# Patient Record
Sex: FEMALE | URBAN - METROPOLITAN AREA
[De-identification: names, ages, dates, MRNs, and addresses within clinical notes are randomized per-mention and may not be internally consistent; named-entity substitution may affect disease eponyms.]

---

## 2022-07-03 ENCOUNTER — NURSE TRIAGE (OUTPATIENT)
Dept: ADMINISTRATIVE | Facility: CLINIC | Age: 62
End: 2022-07-03

## 2022-07-03 NOTE — TELEPHONE ENCOUNTER
Reason for Disposition   Headache  (and neurologic deficit)    Additional Information   Negative: [1] SEVERE weakness (i.e., unable to walk or barely able to walk, requires support) AND [2] new-onset or worsening   Negative: [1] Weakness (i.e., paralysis, loss of muscle strength) of the face, arm / hand, or leg / foot on one side of the body AND [2] sudden onset AND [3] present now (Exception: Bell's palsy suspected [i.e., weakness only one side of the face, developing over hours to days, no other symptoms])   Negative: [1] Numbness (i.e., loss of sensation) of the face, arm / hand, or leg / foot on one side of the body AND [2] sudden onset AND [3] present now   Negative: [1] Loss of speech or garbled speech AND [2] sudden onset AND [3] present now   Negative: Difficult to awaken or acting confused (e.g., disoriented, slurred speech)   Negative: Sounds like a life-threatening emergency to the triager   Negative: Confusion, disorientation, or hallucinations is main symptom   Negative: Neck pain is main symptom (and having weakness, numbness, or tingling in arm / hand because of neck pain)   Negative: Back pain is main symptom (and having weakness, numbness, or tingling in leg because of back pain)   Negative: Hand pain is main symptom (and having mild weakness, numbness, or tingling in hand related to hand pain)   Negative: Dizziness is main symptom   Negative: Vision loss or change is main symptom   Negative: Followed a head injury within last 3 days   Negative: Followed a neck injury within last 3 days   Negative: [1] Tingling in both hands and/or feet AND [2] breathing faster than normal AND [3] feels similar to prior panic attack or hyperventilation episode   Negative: Weakness in both sides of the body or weakness all over    Protocols used: NEUROLOGIC DEFICIT-A-  Patient called re bilat pupils are dilated pretty big post-knee surg. Pt admits to  HA. rec ED. Pt agrees.